# Patient Record
Sex: FEMALE | Race: BLACK OR AFRICAN AMERICAN | Employment: UNEMPLOYED | URBAN - METROPOLITAN AREA
[De-identification: names, ages, dates, MRNs, and addresses within clinical notes are randomized per-mention and may not be internally consistent; named-entity substitution may affect disease eponyms.]

---

## 2019-06-21 ENCOUNTER — HOSPITAL ENCOUNTER (EMERGENCY)
Age: 24
Discharge: HOME | End: 2019-06-21
Attending: OBSTETRICS & GYNECOLOGY | Admitting: OBSTETRICS & GYNECOLOGY
Payer: MEDICAID

## 2019-06-21 VITALS
HEART RATE: 87 BPM | BODY MASS INDEX: 31.5 KG/M2 | TEMPERATURE: 98.8 F | SYSTOLIC BLOOD PRESSURE: 117 MMHG | HEIGHT: 70 IN | WEIGHT: 220 LBS | RESPIRATION RATE: 17 BRPM | DIASTOLIC BLOOD PRESSURE: 75 MMHG

## 2019-06-21 DIAGNOSIS — O26.892 ABDOMINAL PAIN DURING PREGNANCY IN SECOND TRIMESTER: Primary | ICD-10-CM

## 2019-06-21 DIAGNOSIS — R10.9 ABDOMINAL PAIN DURING PREGNANCY IN SECOND TRIMESTER: Primary | ICD-10-CM

## 2019-06-21 LAB
APPEARANCE UR: CLEAR
BILIRUB UR QL: NEGATIVE
COLOR UR: YELLOW
GLUCOSE UR QL STRIP.AUTO: NEGATIVE MG/DL
KETONES UR-MCNC: NEGATIVE MG/DL
LEUKOCYTE ESTERASE UR QL STRIP: NEGATIVE
NITRITE UR QL: NEGATIVE
PH UR: 7 [PH] (ref 5–9)
PROT UR QL: NEGATIVE MG/DL
RBC # UR STRIP: NEGATIVE /UL
SERVICE CMNT-IMP: NORMAL
SP GR UR: 1.02 (ref 1–1.02)
UROBILINOGEN UR QL: 0.2 EU/DL (ref 0.2–1)

## 2019-06-21 PROCEDURE — 81003 URINALYSIS AUTO W/O SCOPE: CPT

## 2019-06-21 PROCEDURE — 99283 EMERGENCY DEPT VISIT LOW MDM: CPT

## 2019-06-21 RX ORDER — ACETAMINOPHEN AND CODEINE PHOSPHATE 300; 30 MG/1; MG/1
1 TABLET ORAL
Qty: 18 TAB | Refills: 0 | Status: SHIPPED | OUTPATIENT
Start: 2019-06-21 | End: 2019-06-24

## 2019-06-21 NOTE — PROGRESS NOTES
Pt  presents to L&D w/ c/o abd pain that began about two days ago its a sharp pain lasting a minute or two and is intermittent. States she also has a HA that is constant but not as bad as it has been in the recent past, she says it got better after eating and drinking. Pt states she has crohn's and believes this abdominal pain to be different than the pain she is used to with that. States she takes Humira 4mg every 2weeks. States she also had a very small amount of bleeding two days ago. [facility] and hasn't seen them in 3mo d/t an insurance change. [id number]-- Updated Dr. Bin Zuniga on patient and states will come to asses  56-- Dr. Bin Zuniga at bedside  1357-- SVE /-3  [phone]-- Discharge order received from Dr. Bin Zuniga  5029-- Discharge instructions given; patient states understanding and agrees to schedule appointment with gastro and OB today.   8363-- pt discharged from unit

## 2019-06-21 NOTE — PROGRESS NOTES
Patient is a 29 yo  at 25 weeks 2 days gestation, presents complaining of mid abdominal pain which has been present for the past 2 days. She has a history of Crohn's disease and states that she sometimes has flares despite taking Humira. She states that this pain has not responded to Tylenol and she came in to be checked because she has not had OB care for the last 3 months. She also notes slight bleeding but denies leaking of fluid and notes good fetal movement. She describes the pain as sharp and intermittent and locates it just above the umbilicus. Exam: abdomen soft and non-tender, fundus non-tender. SVE 0/50/-3  [place]: no uterine activity  FHTs: AGA    UA: SG 1.020, all negative    No OB concerns at this time. Patient given contact information for OB follow up, urged to contact her GI provider ASAP. Rx Tylenol #3 for short- pain relief. Discharge home.